# Patient Record
Sex: MALE | Race: WHITE | NOT HISPANIC OR LATINO | Employment: FULL TIME | URBAN - METROPOLITAN AREA
[De-identification: names, ages, dates, MRNs, and addresses within clinical notes are randomized per-mention and may not be internally consistent; named-entity substitution may affect disease eponyms.]

---

## 2017-07-30 ENCOUNTER — HOSPITAL ENCOUNTER (EMERGENCY)
Facility: HOSPITAL | Age: 57
Discharge: HOME | End: 2017-07-30
Attending: EMERGENCY MEDICINE
Payer: COMMERCIAL

## 2017-07-30 VITALS
OXYGEN SATURATION: 96 % | TEMPERATURE: 97.9 F | DIASTOLIC BLOOD PRESSURE: 85 MMHG | SYSTOLIC BLOOD PRESSURE: 149 MMHG | RESPIRATION RATE: 16 BRPM | WEIGHT: 160.5 LBS | HEART RATE: 74 BPM

## 2017-07-30 DIAGNOSIS — T18.108A ESOPHAGEAL FOREIGN BODY, INITIAL ENCOUNTER: Primary | ICD-10-CM

## 2017-07-30 PROCEDURE — 99283 EMERGENCY DEPT VISIT LOW MDM: CPT

## 2017-07-30 RX ORDER — OMEPRAZOLE 20 MG/1
20 CAPSULE, DELAYED RELEASE ORAL DAILY
Qty: 30 CAPSULE | Refills: 0 | Status: SHIPPED | OUTPATIENT
Start: 2017-07-30 | End: 2018-11-08 | Stop reason: SDUPTHER

## 2017-07-30 RX ORDER — ASPIRIN 81 MG/1
81 TABLET, CHEWABLE ORAL
COMMUNITY

## 2017-07-30 RX ADMIN — OMEPRAZOLE MAGNESIUM 20 MG: 20 CAPSULE, DELAYED RELEASE ORAL at 21:59

## 2017-08-14 ENCOUNTER — ALLSCRIPTS OFFICE VISIT (OUTPATIENT)
Dept: OTHER | Facility: OTHER | Age: 57
End: 2017-08-14

## 2017-08-14 DIAGNOSIS — R13.14 DYSPHAGIA, PHARYNGOESOPHAGEAL PHASE: ICD-10-CM

## 2017-08-14 DIAGNOSIS — K21.9 GASTRO-ESOPHAGEAL REFLUX DISEASE WITHOUT ESOPHAGITIS: ICD-10-CM

## 2017-09-23 ENCOUNTER — ANESTHESIA EVENT (OUTPATIENT)
Dept: PERIOP | Facility: AMBULARY SURGERY CENTER | Age: 57
End: 2017-09-23
Payer: COMMERCIAL

## 2017-09-25 NOTE — PRE-PROCEDURE INSTRUCTIONS
Pre-Surgery Instructions:   Medication Instructions    aspirin 81 mg chewable tablet Instructed patient per Anesthesia Guidelines   omeprazole (PriLOSEC) 20 mg delayed release capsule Instructed patient per Anesthesia Guidelines      Pre op instructions given-pt instructed to follow bowel prep given by

## 2017-10-09 ENCOUNTER — ANESTHESIA (OUTPATIENT)
Dept: PERIOP | Facility: AMBULARY SURGERY CENTER | Age: 57
End: 2017-10-09
Payer: COMMERCIAL

## 2017-10-09 ENCOUNTER — GENERIC CONVERSION - ENCOUNTER (OUTPATIENT)
Dept: OTHER | Facility: OTHER | Age: 57
End: 2017-10-09

## 2017-10-09 ENCOUNTER — HOSPITAL ENCOUNTER (OUTPATIENT)
Facility: AMBULARY SURGERY CENTER | Age: 57
Setting detail: OUTPATIENT SURGERY
Discharge: HOME | End: 2017-10-09
Attending: INTERNAL MEDICINE | Admitting: INTERNAL MEDICINE
Payer: COMMERCIAL

## 2017-10-09 VITALS
BODY MASS INDEX: 23.49 KG/M2 | HEIGHT: 68 IN | HEART RATE: 65 BPM | DIASTOLIC BLOOD PRESSURE: 84 MMHG | TEMPERATURE: 98.5 F | WEIGHT: 155 LBS | RESPIRATION RATE: 20 BRPM | SYSTOLIC BLOOD PRESSURE: 122 MMHG | OXYGEN SATURATION: 100 %

## 2017-10-09 DIAGNOSIS — Z12.11 ENCOUNTER FOR SCREENING FOR MALIGNANT NEOPLASM OF COLON: ICD-10-CM

## 2017-10-09 DIAGNOSIS — K21.9 CHRONIC GERD: ICD-10-CM

## 2017-10-09 DIAGNOSIS — R13.19 ESOPHAGEAL DYSPHAGIA: ICD-10-CM

## 2017-10-09 PROCEDURE — 88342 IMHCHEM/IMCYTCHM 1ST ANTB: CPT | Performed by: INTERNAL MEDICINE

## 2017-10-09 PROCEDURE — 88305 TISSUE EXAM BY PATHOLOGIST: CPT | Performed by: INTERNAL MEDICINE

## 2017-10-09 RX ORDER — SODIUM CHLORIDE, SODIUM LACTATE, POTASSIUM CHLORIDE, CALCIUM CHLORIDE 600; 310; 30; 20 MG/100ML; MG/100ML; MG/100ML; MG/100ML
125 INJECTION, SOLUTION INTRAVENOUS CONTINUOUS
Status: DISCONTINUED | OUTPATIENT
Start: 2017-10-09 | End: 2017-10-09 | Stop reason: HOSPADM

## 2017-10-09 RX ORDER — PROPOFOL 10 MG/ML
INJECTION, EMULSION INTRAVENOUS AS NEEDED
Status: DISCONTINUED | OUTPATIENT
Start: 2017-10-09 | End: 2017-10-09 | Stop reason: SURG

## 2017-10-09 RX ORDER — LIDOCAINE HYDROCHLORIDE 10 MG/ML
INJECTION, SOLUTION INFILTRATION; PERINEURAL AS NEEDED
Status: DISCONTINUED | OUTPATIENT
Start: 2017-10-09 | End: 2017-10-09 | Stop reason: SURG

## 2017-10-09 RX ADMIN — PROPOFOL 20 MG: 10 INJECTION, EMULSION INTRAVENOUS at 10:25

## 2017-10-09 RX ADMIN — PROPOFOL 20 MG: 10 INJECTION, EMULSION INTRAVENOUS at 10:41

## 2017-10-09 RX ADMIN — PROPOFOL 30 MG: 10 INJECTION, EMULSION INTRAVENOUS at 10:17

## 2017-10-09 RX ADMIN — PROPOFOL 150 MG: 10 INJECTION, EMULSION INTRAVENOUS at 10:15

## 2017-10-09 RX ADMIN — PROPOFOL 20 MG: 10 INJECTION, EMULSION INTRAVENOUS at 10:31

## 2017-10-09 RX ADMIN — PROPOFOL 20 MG: 10 INJECTION, EMULSION INTRAVENOUS at 10:22

## 2017-10-09 RX ADMIN — PROPOFOL 20 MG: 10 INJECTION, EMULSION INTRAVENOUS at 10:38

## 2017-10-09 RX ADMIN — LIDOCAINE HYDROCHLORIDE 100 MG: 10 INJECTION, SOLUTION INFILTRATION; PERINEURAL at 10:15

## 2017-10-09 RX ADMIN — PROPOFOL 20 MG: 10 INJECTION, EMULSION INTRAVENOUS at 10:18

## 2017-10-09 RX ADMIN — PROPOFOL 20 MG: 10 INJECTION, EMULSION INTRAVENOUS at 10:28

## 2017-10-09 RX ADMIN — PROPOFOL 20 MG: 10 INJECTION, EMULSION INTRAVENOUS at 10:20

## 2017-10-09 RX ADMIN — SODIUM CHLORIDE, SODIUM LACTATE, POTASSIUM CHLORIDE, AND CALCIUM CHLORIDE: .6; .31; .03; .02 INJECTION, SOLUTION INTRAVENOUS at 09:45

## 2017-10-09 RX ADMIN — PROPOFOL 30 MG: 10 INJECTION, EMULSION INTRAVENOUS at 10:35

## 2017-10-09 NOTE — ANESTHESIA POSTPROCEDURE EVALUATION
Post-Op Assessment Note      CV Status:  Stable    Mental Status:  Lethargic    Hydration Status:  Euvolemic    PONV Controlled:  Controlled    Airway Patency:  Patent and adequate    Post Op Vitals Reviewed: Yes          Staff: CRNA       Comments: Airway reflexes intact          BP   98/63   Temp      Pulse  81   Resp      SpO2   96% on RA

## 2017-10-09 NOTE — ANESTHESIA PREPROCEDURE EVALUATION
Review of Systems/Medical History  Patient summary reviewed    No history of anesthetic complications     Cardiovascular    Comment: S/P successful ablation of tachyrhythmia ,  Pulmonary  Asthma: well controlled/ stable , ,        GI/Hepatic  Negative GI/hepatic ROS   GERD , Bowel prep       Negative  ROS        Endo/Other  Negative endo/other ROS      GYN       Hematology  Negative hematology ROS      Musculoskeletal  Negative musculoskeletal ROS        Neurology  Negative neurology ROS      Psychology   Negative psychology ROS            Physical Exam    Airway    Mallampati score:  I  TM Distance: >3 FB  Neck ROM: full     Dental   No notable dental hx     Cardiovascular      Pulmonary      Other Findings        Anesthesia Plan  ASA Score- 2       Anesthesia Type- IV sedation with anesthesia        Induction- intravenous      Informed Consent  Anesthetic plan and risks discussed with patient

## 2017-10-09 NOTE — OP NOTE
ESOPHAGOGASTRODUODENOSCOPY    PROCEDURE: EGD/ Biopsy    INDICATIONS: Dysphagia and GERD, Hx of Food impaction    POST-OP DIAGNOSIS: See the impression below    SEDATION: Monitored anesthesia care, check anesthesia records    PHYSICAL EXAM:    Vitals:    10/09/17 0931   BP: 130/82   Pulse: 75   Resp: 18   Temp: 98 6 °F (37 °C)   SpO2: 99%    Body mass index is 23 57 kg/m²  General: NAD  Heart: S1 & S2 normal, RRR  Lungs: CTA, No rales or rhonchi  Abdomen: Soft, nontender, nondistended, good bowel sounds    CONSENT:  Informed consent was obtained for the procedure, including sedation after explaining the risks and benefits of the procedure  Risks including but not limited to bleeding, perforation, infection, aspiration were discussed in detail  Also explained about less than 100% sensitivity with the exam and other alternatives  PREPARATION:   EKG tracing, pulse oximetry, blood pressure were monitored throughout the procedure  Patient was identified by myself both verbally and by visual inspection of ID band  DESCRIPTION:   Patient was placed in the left lateral decubitus position and was sedated with the above medication  The gastroscope was introduced in to the oropharynx and the esophagus was intubated under direct visualization  Scope was passed down the esophagus up to 2nd part of the duodenum  A careful inspection was made as the gastroscope was withdrawn, including a retroflexed view of the stomach; findings and interventions are described below  FINDINGS:    #1  Esophagus and GEJ-there was mild tracheazation  noted in the mid esophagus, suspicious for eosinophilic esophagitis  Biopsies were taken from the esophagus  Irregular Z-line was noted at 38 cm, with 1 salmon-colored tongue extending 1 cm above the Z-line, suspicious for short-segment Ventura's esophagus  Biopsies were taken  There was a 2 5 cm hiatal hernia starting from 36-38 5 cm      #2  Stomach-mild erythema noted in the gastric antrum, biopsies taken from the antrum and body to assess for H pylori  Retroflexion showed hiatal hernia  No mass lesions were noted  Incisura appeared unremarkable  #3  Duodenum-duodenal mucosa appeared normal in the bulb, D1 and D2  Biopsies were taken to assess for celiac disease  IMPRESSIONS:      1  Irregular squamocolumnar junction, suspicious for short-segment Ventura's esophagus, biopsies taken  2   Mild tracheazation suspicious for eosinophilic esophagitis, biopsies taken from the esophagus  3   Mild antral gastritis  4   Small hiatal hernia  RECOMMENDATIONS:     1  Follow-up biopsy results in 2 weeks  2   Restart on PPI, can change to omeprazole 40 mg daily, to be taken 30 minutes before breakfast   3   Avoid NSAIDs  4   Avoid fatty foods, chocolates, caffeine, alcohol and any other triggering foods  Avoid eating for at least 3 hours before going to bed  COMPLICATIONS:  None; patient tolerated the procedure well            DISPOSITION: PACU           CONDITION: Stable

## 2017-10-09 NOTE — DISCHARGE INSTRUCTIONS
Colonoscopy   WHAT YOU NEED TO KNOW:   A colonoscopy is a procedure to examine the inside of your colon (intestine) with a scope  Polyps or tissue growths may have been removed during your colonoscopy  It is normal to feel bloated and to have some abdominal discomfort  You should be passing gas  If you have hemorrhoids or you had polyps removed, you may have a small amount of bleeding  DISCHARGE INSTRUCTIONS:   Seek care immediately if:   · You have a large amount of bright red blood in your bowel movements  · Your abdomen is hard and firm and you have severe pain  · You have sudden trouble breathing  Contact your healthcare provider if:   · You develop a rash or hives  · You have a fever within 24 hours of your procedure       · You have not had a bowel movement for 3 days after your procedure  · You have questions or concerns about your condition or care  Activity:   · Do not lift, strain, or run  for 3 days after your procedure  · Rest after your procedure  You have been given medicine to relax you  Do not  drive or make important decisions until the day after your procedure  Return to your normal activity as directed  · Relieve gas and discomfort from bloating  by lying on your right side with a heating pad on your abdomen  You may need to take short walks to help the gas move out  Eat small meals until bloating is relieved  If you had polyps removed: For 7 days after your procedure:  · Do not  take aspirin  · Do not  go on long car rides  Follow up with your healthcare provider as directed:  Write down your questions so you remember to ask them during your visits  © 2017 [employer] is for End User's use only and may not be sold, redistributed or otherwise used for commercial purposes  All illustrations and images included in CareNotes® are the copyrighted property of [employer]  or [employer]    The above information is an  only  It is not intended as medical advice for individual conditions or treatments  Talk to your doctor, nurse or pharmacist before following any medical regimen to see if it is safe and effective for you  Upper Endoscopy   WHAT YOU NEED TO KNOW:   An upper endoscopy is also called an upper gastrointestinal (GI) endoscopy, or an esophagogastroduodenoscopy (EGD)  You may feel bloated, gassy, or have some abdominal discomfort after your procedure  Your throat may be sore for 24 to 36 hours  You may burp or pass gas from air that is still inside your body  DISCHARGE INSTRUCTIONS:   Call 911 for any of the following:   · You have sudden chest pain or trouble breathing  Seek care immediately if:   · You feel dizzy or faint  · You have trouble swallowing  · Your bowel movements are very dark or black  · Your abdomen is hard and firm and you have severe pain  · You vomit blood  Contact your healthcare provider if:   · You feel full or bloated and cannot burp or pass gas  · You have not had a bowel movement for 3 days after your procedure  · You have neck pain  · You have a fever or chills  · You have nausea or are vomiting  · You have a rash or hives  · You have questions or concerns about your endoscopy  Relieve a sore throat:  Suck on throat lozenges or crushed ice  Gargle with a small amount of warm salt water  Mix 1 teaspoon of salt and 1 cup of warm water to make salt water  Relieve gas and discomfort from bloating:  Lie on your right side with a heating pad on your abdomen  Take short walks to help pass gas  Eat small meals until bloating is relieved  Rest after your procedure: You have been given medicine to relax you  Do not  drive or make important decisions until the day after your procedure  Return to your normal activity as directed  You can usually return to work the day after your procedure    Follow up with your healthcare provider as directed:  Write down your questions so you remember to ask them during your visits  © 2017 [employer] is for End User's use only and may not be sold, redistributed or otherwise used for commercial purposes  All illustrations and images included in CareNotes® are the copyrighted property of [employer]  or [employer]  The above information is an  only  It is not intended as medical advice for individual conditions or treatments  Talk to your doctor, nurse or pharmacist before following any medical regimen to see if it is safe and effective for you

## 2017-10-09 NOTE — OP NOTE
COLONOSCOPY    PROCEDURE: Colonoscopy/ Biopsy    INDICATIONS: Screening for Colon Cancer    POST-OP DIAGNOSIS: See the impression below    SEDATION: Monitored anesthesia care, check anesthesia records    PHYSICAL EXAM:    /82   Pulse 75   Temp 98 6 °F (37 °C) (Tympanic)   Resp 18   Ht 5' 8" (1 727 m)   Wt 70 3 kg (155 lb)   SpO2 99%   BMI 23 57 kg/m²   Body mass index is 23 57 kg/m²  General: NAD  Heart: S1 & S2 normal, RRR  Lungs: CTA, No rales or rhonchi  Abdomen: Soft, nontender, nondistended, good bowel sounds    CONSENT:  Informed consent was obtained for the procedure, including sedation after explaining the risks and benefits of the procedure  Risks including but not limited to bleeding, perforation, infection, aspiration were discussed in detail  Also explained about less than 100% sensitivity with the exam and other alternatives  PREPARATION:   EKG tracing, pulse oximetry, blood pressure were monitored throughout the procedure  Patient was identified by myself both verbally and by visual inspection of ID band  DESCRIPTION:   Patient was placed in the left lateral decubitus position and was sedated with the above medication  Digital rectal examination was performed  The colonoscope was introduced in to the anal canal and advanced up to terminal ileum  The colonic mucosa was carefully examined as the colonoscope was withdrawn, including a retroflexed view of the rectum; findings and interventions are described below  Appropriate photodocumentation was obtained  The quality of the colonic preparation was good  FINDINGS:    1  Normal appearing colonic mucosa  2   Terminal ileum was intubated and appeared unremarkable  3   Few scattered diverticuli noted in the sigmoid colon  4   Retroflexion was performed and showed small internal hemorrhoids  IMPRESSIONS:      1  Normal appearing colonic mucosa  2   Terminal ileum was intubated and appeared unremarkable    3   Few scattered diverticuli noted in the sigmoid colon  4   Retroflexion was performed and showed small internal hemorrhoids  RECOMMENDATIONS:    1  High-fiber diet  2   Repeat colonoscopy in 10 years, sooner if any family history of colon cancer or any alarm symptoms  COMPLICATIONS:  None; patient tolerated the procedure well      DISPOSITION: PACU           CONDITION: Stable

## 2017-10-09 NOTE — H&P
History and Physical - [facility]  Peng Bolaños 61 y o  male MRN: [id number]    HPI: Peng Bolaños is a 61y o  year old male who presents with Dysphagia and CRC screening  Review of Systems    Historical Information   Past Medical History:   Diagnosis Date    Asthma     allergy induced    GERD (gastroesophageal reflux disease)     Irregular heart beat     has had an ablation since     Past Surgical History:   Procedure Laterality Date    ABLATION OF DYSRHYTHMIC FOCUS      KNEE ARTHROSCOPY Left     KNEE CARTILAGE SURGERY Left      Social History   History   Alcohol Use    Yes     Comment: socially     History   Drug Use No     History   Smoking Status    Never Smoker   Smokeless Tobacco    Never Used     History reviewed  No pertinent family history  Meds/Allergies     Prescriptions Prior to Admission   Medication    aspirin 81 mg chewable tablet    omeprazole (PriLOSEC) 20 mg delayed release capsule       Allergies   Allergen Reactions    Compazine [Prochlorperazine] Other (See Comments)     Muscles locked and very weak muscle       Objective     Blood pressure 130/82, pulse 75, temperature 98 6 °F (37 °C), temperature source Tympanic, resp  rate 18, height 5' 8" (1 727 m), weight 70 3 kg (155 lb), SpO2 99 %  PHYSICAL EXAM    Gen: NAD  CV: RRR  CHEST: Clear  ABD: soft, NT/ND  EXT: no edema  Neuro: AAO      ASSESSMENT/PLAN:  This is a 61y o  year old male here for Dysphagia and CRC screening      PLAN:   Procedure: EGD and Colonoscopy

## 2017-10-18 ENCOUNTER — GENERIC CONVERSION - ENCOUNTER (OUTPATIENT)
Dept: OTHER | Facility: OTHER | Age: 57
End: 2017-10-18

## 2018-01-13 VITALS
DIASTOLIC BLOOD PRESSURE: 76 MMHG | OXYGEN SATURATION: 99 % | SYSTOLIC BLOOD PRESSURE: 122 MMHG | TEMPERATURE: 95.9 F | WEIGHT: 157 LBS | HEART RATE: 89 BPM

## 2018-01-13 NOTE — RESULT NOTES
Verified Results  (1) TISSUE EXAM [id number] 10:17AM Trevonita      Test Name Result Flag Reference   LAB AP CASE REPORT (Report)     Surgical Pathology Report             Case: [id number]                   Authorizing Provider: Alexandra Tony MD    Collected:      10/09/2017 1017        Ordering Location:   [facility]    Received:      10/10/2017 [facility]                            Pathologist:      Michelle Gomes MD                             Specimens:  A) - Duodenum, Duodenal bx                                       B) - Stomach, Gastric bx                                        C) - Esophagus, Distal esoph bx                                    D) - Esophagus, Esophageal bx   LAB AP FINAL DIAGNOSIS (Report)     A  Duodenum, biopsy:    - No significant histologic abnormality     - No increase in intraepithelial lymphocytes, villous blunting, acute   and chronic inflammation     - Negative for granulomas, dysplasia and malignancy  B  Gastric biopsy:    - No significant histologic abnormality     - Negative for Helicobacter pylori organisms (immunohistochemical stain   with appropriate positive control)  C  Distal esophagus, biopsy:    - Gastric cardia type mucosa with mild chronic inflammation     - Negative for intestinal metaplasia, dysplasia and malignancy  D  Esophagus, biopsy:    - 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   ranging from 3-10/hpf  - Negative for intestinal metaplasia, dysplasia and malignancy  Electronically signed by Michelle Gomes MD on 10/11/2017 at 8:50 AM   LAB AP NOTE      The findings noted in the esophageal biopsy (part D) are in favor of   reflux over eosinophilic esophagitis  Correlation with clinical and endoscopic findings is suggested      Interpretation performed at [address]   [address] LAB AP SURGICAL ADDITIONAL INFORMATION (Report)     All controls performed with the immunohistochemical stains reported above   reacted appropriately  These tests were developed and their performance   characteristics determined by [facility] or   [facility]  They may not be cleared or approved by the [place]  Food and Drug Administration  The FDA has determined that such clearance   or approval is not necessary  These tests are used for clinical purposes  They should not be regarded as investigational or for research  This   laboratory has been approved by [place] [address], designated as a high-complexity   laboratory and is qualified to perform these tests  LAB AP GROSS DESCRIPTION (Report)     A  The specimen is received in formalin, labeled with the patient's name   and hospital number, and is designated duodenal biopsy  The specimen   consists of 5 tan to yellow soft tissue fragments ranging in greatest   dimension from 0 3 to 0 4 centimeters  Entirely submitted  One cassette  B  The specimen is received in formalin, labeled with the patient's name   and hospital number, and is designated Gastric biopsy  The specimen   consists of 2 white to tan soft tissue fragments measuring 0 5 and 0 6   centimeters in greatest dimension  Entirely submitted  One cassette  C  The specimen is received in formalin, labeled with the patient's name   and hospital number, and is designated Distal esophagus biopsy  The   specimen consists of 3 white to tan soft tissue fragments ranging in   greatest dimension from 0 2 to 0 4 centimeters  Entirely submitted  One   cassette  D  The specimen is received in formalin, labeled with the patient's name   and hospital number, and is designated Esophageal biopsy  The specimen   consists of an aggregate of colorless to white soft tissue fragments   measuring 0 7 x 0 4 by less than 0 1 centimeters   Due to the size and   consistency of the specimen, it is questionable whether tissue will   survive histologic processing  Entirely submitted  One cassette  Note: The estimated total formalin fixation time based upon information   provided by the submitting clinician and the standard processing schedule   is less than 72 hours      LMS   LAB AP CLINICAL INFORMATION R/o celiac     R/o celiac

## 2018-11-08 ENCOUNTER — TELEPHONE (OUTPATIENT)
Dept: GASTROENTEROLOGY | Facility: CLINIC | Age: 58
End: 2018-11-08

## 2018-11-08 DIAGNOSIS — K21.9 GASTROESOPHAGEAL REFLUX DISEASE, ESOPHAGITIS PRESENCE NOT SPECIFIED: Primary | ICD-10-CM

## 2018-11-08 RX ORDER — OMEPRAZOLE 40 MG/1
1 CAPSULE, DELAYED RELEASE ORAL 2 TIMES DAILY
COMMUNITY
Start: 2017-08-14 | End: 2018-11-08 | Stop reason: SDUPTHER

## 2018-11-08 RX ORDER — OMEPRAZOLE 40 MG/1
40 CAPSULE, DELAYED RELEASE ORAL 2 TIMES DAILY
Qty: 90 CAPSULE | Refills: 4 | Status: SHIPPED | OUTPATIENT
Start: 2018-11-08 | End: 2019-01-17

## 2018-11-08 NOTE — TELEPHONE ENCOUNTER
Dr Ricco Ledezma pt    [facility] to [facility] if the script for omeprazole can be changed to 90 day   [phone] reference # [id number]

## 2019-01-17 ENCOUNTER — OFFICE VISIT (OUTPATIENT)
Dept: GASTROENTEROLOGY | Facility: AMBULARY SURGERY CENTER | Age: 59
End: 2019-01-17
Payer: COMMERCIAL

## 2019-01-17 VITALS
DIASTOLIC BLOOD PRESSURE: 85 MMHG | RESPIRATION RATE: 18 BRPM | HEIGHT: 69 IN | TEMPERATURE: 97.6 F | BODY MASS INDEX: 22.84 KG/M2 | WEIGHT: 154.2 LBS | HEART RATE: 82 BPM | SYSTOLIC BLOOD PRESSURE: 112 MMHG

## 2019-01-17 DIAGNOSIS — K21.00 GASTROESOPHAGEAL REFLUX DISEASE WITH ESOPHAGITIS: Primary | ICD-10-CM

## 2019-01-17 PROCEDURE — 99213 OFFICE O/P EST LOW 20 MIN: CPT | Performed by: PHYSICIAN ASSISTANT

## 2019-01-17 RX ORDER — RANITIDINE 300 MG/1
300 TABLET ORAL
Qty: 90 TABLET | Refills: 3 | Status: SHIPPED | OUTPATIENT
Start: 2019-01-17 | End: 2020-02-14 | Stop reason: ALTCHOICE

## 2019-01-17 NOTE — PROGRESS NOTES
Assessment and Plan    #1  GERD with esophagitis: patient has a history of food impaction which resolved on its own in July 2017  He underwent EGD in Oct 2017 which showed mild esophagitis but no Ventura's or EoE  He was taking omeprazole 40 mg BID with some abdominal discomfort ad indigestion so he decreased to once daily  This persisted so he stopped the medication with much improvement but now has occasional night time acid reflux symptoms  No dysphagia  No other GI symptoms  -Advised that we try Zantac 300 mg before bed instead as he will likely not get the same side effects but will prevent acid reflux at night  -Also discussed anti-reflux diet and to avoid chocolate, citrus, carbonated beverages, caffeine, alcohol, acidic foods, tomato based products  Also advised patient that he should try to elevate the head of the bed and avoid eating within 3 hr of lying down   -patient to follow-up in 1 year  If he has any worsening symptoms or any concerns, he is to call sooner    --------------------------------------------------------------------------------------------------------------------    Chief Complaint: f/u GERD    HPI: Tabitha Yo is a 60 y o  male who presents today for follow up for GERD  The patient was seen back in July 2017 in the ER due to a food impaction which passed on its own  The patient followed up in the office and subsequently underwent an EGD and colonoscopy in October of 2017  The EGD showed a very small tongue in the bottom of the esophagus but this was negative for Ventura's  He was also negative for eosinophilic esophagitis  He reports that he was placed on omeprazole twice daily and had indigestion and abdominal discomfort on the medication  He reports he then drop it down to once daily for some time but continued to have some of the side effects  He subsequently stopped the medication and the symptoms resolved    However now he will occasionally get acid reflux symptoms mainly at nighttime  He is not currently taking anything for acid reflux  He denies any dysphagia symptoms and denies any food getting stuck at this time  He denies any other GI symptoms such as nausea, vomiting, abdominal pain, unexpected weight loss, diarrhea, constipation, blood in stool, or black tarry stools  Patient's next colonoscopy is due in 2027  Review of Systems:   General: negative for fatigue, fever, night sweats or unexpected weight loss  Psychological: negative for anxiety or depression  Ophthalmic: negative for blurry vision or scleral icterus  ENT: negative for headaches, oral lesions, sore throat, vocal changes or dysphagia  Hematological and Lymphatic: negative for pallor or swollen lymph nodes  Respiratory: negative for cough, shortness of breath or wheezing  Cardiovascular: negative for chest pain, edema or murmur  Gastrointestinal: as mentioned in HPI  Genito-Urinary: negative for dysuria or incontinence  Musculoskeletal: negative for joint pain, joint stiffness or joint swelling  Dermatological: negative for pruritus, rash, or jaundice    Current Medications  Current Outpatient Prescriptions   Medication Sig Dispense Refill    aspirin 81 mg chewable tablet Chew 81 mg daily in the early morning        ranitidine (ZANTAC) 300 MG tablet Take 1 tablet (300 mg total) by mouth daily at bedtime 90 tablet 3     No current facility-administered medications for this visit          Past Medical History  Past Medical History:   Diagnosis Date    Asthma     allergy induced    GERD (gastroesophageal reflux disease)     Irregular heart beat     has had an ablation since       Past Surgical History  Past Surgical History:   Procedure Laterality Date    ABLATION OF DYSRHYTHMIC FOCUS      KNEE ARTHROSCOPY Left     KNEE CARTILAGE SURGERY Left     UT COLONOSCOPY FLX DX W/COLLJ SPEC WHEN PFRMD N/A 10/9/2017    Procedure: EGD AND COLONOSCOPY;  Surgeon: Roe Langley MD;  [facility];  Service: Gastroenterology       Past Social History   Social History     Social History    Marital status: /Civil Union     Spouse name: N/A    Number of children: N/A    Years of education: N/A     Social History Main Topics    Smoking status: Never Smoker    Smokeless tobacco: Never Used    Alcohol use Yes      Comment: socially    Drug use: No    Sexual activity: Not Asked     Other Topics Concern    None     Social History Narrative    None       The following portions of the patient's history were reviewed and updated as appropriate: allergies, current medications, past family history, past medical history, past social history, past surgical history and problem list     Vital Signs  Vitals:    01/17/19 0821   BP: 112/85   BP Location: Left arm   Patient Position: Sitting   Cuff Size: Standard   Pulse: 82   Resp: 18   Temp: 97 6 °F (36 4 °C)   TempSrc: Tympanic   Weight: 69 9 kg (154 lb 3 2 oz)   Height: 5' 8 5" (1 74 m)       Physical Exam:  General appearance: alert, cooperative, no distress  HEENT: normocephalic, anicteric, no eye erythema or discharge, no oropharyngeal thrush  Neck: supple, trachea midline, no adenopathy  Lungs: CTA b/l, no rales, rhonchi, or wheezing, unlabored respirations  Heart: RRR, no murmur, rubs, or gallops  Abdomen: soft, non-tender, non-distended, normal bowel sounds, no masses or organomegaly  Rectal: deferred  Extremities: no cyanosis, clubbing, or edema  Musculoskeletal: normal gait  Skin: color and texture normal, no jaundice, no rashes or lesions  Psychiatric: alert and oriented, normal affect and behavior

## 2019-01-17 NOTE — LETTER
January 17, 2019     DO Iza Boothe [address]    Patient: Juan Carlos Gaxiola   YOB: 1960   Date of Visit: 1/17/2019       Dear Dr Gerber Morelos:    Thank you for referring Amada Brown to me for evaluation  Below are my notes for this consultation  If you have questions, please do not hesitate to call me  I look forward to following your patient along with you  Sincerely,        Dallas Mercer PA-C        CC: No Recipients  Dallas Mercer PA-C  1/17/2019  9:05 AM  Sign at close encounter  Assessment and Plan    #1  GERD with esophagitis: patient has a history of food impaction which resolved on its own in July 2017  He underwent EGD in Oct 2017 which showed mild esophagitis but no Ventura's or EoE  He was taking omeprazole 40 mg BID with some abdominal discomfort ad indigestion so he decreased to once daily  This persisted so he stopped the medication with much improvement but now has occasional night time acid reflux symptoms  No dysphagia  No other GI symptoms  -Advised that we try Zantac 300 mg before bed instead as he will likely not get the same side effects but will prevent acid reflux at night  -Also discussed anti-reflux diet and to avoid chocolate, citrus, carbonated beverages, caffeine, alcohol, acidic foods, tomato based products  Also advised patient that he should try to elevate the head of the bed and avoid eating within 3 hr of lying down   -patient to follow-up in 1 year  If he has any worsening symptoms or any concerns, he is to call sooner    --------------------------------------------------------------------------------------------------------------------    Chief Complaint: f/u GERD    HPI: Juan Carlos Gaxiola is a 60 y o  male who presents today for follow up for GERD  The patient was seen back in July 2017 in the ER due to a food impaction which passed on its own    The patient followed up in the office and subsequently underwent an EGD and colonoscopy in October of 2017  The EGD showed a very small tongue in the bottom of the esophagus but this was negative for Ventura's  He was also negative for eosinophilic esophagitis  He reports that he was placed on omeprazole twice daily and had indigestion and abdominal discomfort on the medication  He reports he then drop it down to once daily for some time but continued to have some of the side effects  He subsequently stopped the medication and the symptoms resolved  However now he will occasionally get acid reflux symptoms mainly at nighttime  He is not currently taking anything for acid reflux  He denies any dysphagia symptoms and denies any food getting stuck at this time  He denies any other GI symptoms such as nausea, vomiting, abdominal pain, unexpected weight loss, diarrhea, constipation, blood in stool, or black tarry stools  Patient's next colonoscopy is due in 2027       Review of Systems:   General: negative for fatigue, fever, night sweats or unexpected weight loss  Psychological: negative for anxiety or depression  Ophthalmic: negative for blurry vision or scleral icterus  ENT: negative for headaches, oral lesions, sore throat, vocal changes or dysphagia  Hematological and Lymphatic: negative for pallor or swollen lymph nodes  Respiratory: negative for cough, shortness of breath or wheezing  Cardiovascular: negative for chest pain, edema or murmur  Gastrointestinal: as mentioned in HPI  Genito-Urinary: negative for dysuria or incontinence  Musculoskeletal: negative for joint pain, joint stiffness or joint swelling  Dermatological: negative for pruritus, rash, or jaundice    Current Medications  Current Outpatient Prescriptions   Medication Sig Dispense Refill    aspirin 81 mg chewable tablet Chew 81 mg daily in the early morning        ranitidine (ZANTAC) 300 MG tablet Take 1 tablet (300 mg total) by mouth daily at bedtime 90 tablet 3     No current facility-administered medications for this visit  Past Medical History  Past Medical History:   Diagnosis Date    Asthma     allergy induced    GERD (gastroesophageal reflux disease)     Irregular heart beat     has had an ablation since       Past Surgical History  Past Surgical History:   Procedure Laterality Date    ABLATION OF DYSRHYTHMIC FOCUS      KNEE ARTHROSCOPY Left     KNEE CARTILAGE SURGERY Left     AZ COLONOSCOPY FLX DX W/COLLJ SPEC WHEN PFRMD N/A 10/9/2017    Procedure: EGD AND COLONOSCOPY;  Surgeon: Lisseth Fuller MD;  [facility];   Service: Gastroenterology       Past Social History   Social History     Social History    Marital status: /Civil Union     Spouse name: N/A    Number of children: N/A    Years of education: N/A     Social History Main Topics    Smoking status: Never Smoker    Smokeless tobacco: Never Used    Alcohol use Yes      Comment: socially    Drug use: No    Sexual activity: Not Asked     Other Topics Concern    None     Social History Narrative    None       The following portions of the patient's history were reviewed and updated as appropriate: allergies, current medications, past family history, past medical history, past social history, past surgical history and problem list     Vital Signs  Vitals:    01/17/19 0821   BP: 112/85   BP Location: Left arm   Patient Position: Sitting   Cuff Size: Standard   Pulse: 82   Resp: 18   Temp: 97 6 °F (36 4 °C)   TempSrc: Tympanic   Weight: 69 9 kg (154 lb 3 2 oz)   Height: 5' 8 5" (1 74 m)       Physical Exam:  General appearance: alert, cooperative, no distress  HEENT: normocephalic, anicteric, no eye erythema or discharge, no oropharyngeal thrush  Neck: supple, trachea midline, no adenopathy  Lungs: CTA b/l, no rales, rhonchi, or wheezing, unlabored respirations  Heart: RRR, no murmur, rubs, or gallops  Abdomen: soft, non-tender, non-distended, normal bowel sounds, no masses or organomegaly  Rectal: deferred  Extremities: no cyanosis, clubbing, or edema  Musculoskeletal: normal gait  Skin: color and texture normal, no jaundice, no rashes or lesions  Psychiatric: alert and oriented, normal affect and behavior

## 2019-05-02 ENCOUNTER — TELEPHONE (OUTPATIENT)
Dept: GASTROENTEROLOGY | Facility: AMBULARY SURGERY CENTER | Age: 59
End: 2019-05-02

## 2020-01-14 ENCOUNTER — TELEPHONE (OUTPATIENT)
Dept: GASTROENTEROLOGY | Facility: AMBULARY SURGERY CENTER | Age: 60
End: 2020-01-14

## 2020-02-14 ENCOUNTER — OFFICE VISIT (OUTPATIENT)
Dept: GASTROENTEROLOGY | Facility: CLINIC | Age: 60
End: 2020-02-14
Payer: COMMERCIAL

## 2020-02-14 VITALS
SYSTOLIC BLOOD PRESSURE: 120 MMHG | DIASTOLIC BLOOD PRESSURE: 74 MMHG | TEMPERATURE: 98.7 F | HEART RATE: 67 BPM | WEIGHT: 161 LBS | HEIGHT: 67 IN | BODY MASS INDEX: 25.27 KG/M2

## 2020-02-14 DIAGNOSIS — K21.9 GASTROESOPHAGEAL REFLUX DISEASE, ESOPHAGITIS PRESENCE NOT SPECIFIED: Primary | ICD-10-CM

## 2020-02-14 PROCEDURE — 99212 OFFICE O/P EST SF 10 MIN: CPT | Performed by: PHYSICIAN ASSISTANT

## 2020-02-14 RX ORDER — FAMOTIDINE 40 MG/1
TABLET, FILM COATED ORAL
COMMUNITY
Start: 2019-12-23 | End: 2020-02-14 | Stop reason: SDUPTHER

## 2020-02-14 RX ORDER — FAMOTIDINE 40 MG/1
40 TABLET, FILM COATED ORAL 2 TIMES DAILY
Qty: 180 TABLET | Refills: 3 | Status: SHIPPED | OUTPATIENT
Start: 2020-02-14

## 2020-02-14 NOTE — PROGRESS NOTES
[facility] - Outpatient Follow-up Note  Alan To 60 y o  male MRN: [id number]  Encounter: [id number]  ASSESSMENT AND PLAN:      GERD  -Had been doing well on ranitidine until recall  -recommend he start famotidine 40 daiy, BID if needed  -reviewed reflux diet  -follow up in 1 year, call sooner if needed  ______________________________________________________________________    SUBJECTIVE:  Pleasant 61 yo male with a chronic history of GERD, was on PPI in the past, at a point switched to ranitidine but then stopped this when it was recalled  He then had recurrent indigestion and GERD off and on  He denies dysphagia or abdominal pain  Denies changes in bowel habits      REVIEW OF SYSTEMS IS OTHERWISE NEGATIVE  Historical Information   Past Medical History:   Diagnosis Date    Asthma     allergy induced    GERD (gastroesophageal reflux disease)     Irregular heart beat     has had an ablation since     Past Surgical History:   Procedure Laterality Date    ABLATION OF DYSRHYTHMIC FOCUS      KNEE ARTHROSCOPY Left     KNEE CARTILAGE SURGERY Left     SD COLONOSCOPY FLX DX W/COLLJ SPEC WHEN PFRMD N/A 10/9/2017    Procedure: EGD AND COLONOSCOPY;  Surgeon: Angely Corral MD;  [facility];   Service: Gastroenterology     Social History   Social History     Substance and Sexual Activity   Alcohol Use Yes    Comment: socially     Social History     Substance and Sexual Activity   Drug Use No     Social History     Tobacco Use   Smoking Status Never Smoker   Smokeless Tobacco Never Used     Family History   Problem Relation Age of Onset    No Known Problems Mother        Meds/Allergies       Current Outpatient Medications:     aspirin 81 mg chewable tablet    ranitidine (ZANTAC) 300 MG tablet    Allergies   Allergen Reactions    Compazine [Prochlorperazine] Other (See Comments)     Muscles locked and very weak muscle           Objective     There were no vitals taken for this visit  There is no height or weight on file to calculate BMI  PHYSICAL EXAM:      General Appearance:   Alert, cooperative, no distress   HEENT:   Normocephalic, atraumatic, anicteric      Neck:  Supple, symmetrical, trachea midline   Lungs:   ; respirations unlabored    Heart[de-identified]   Regular rate and rhythm; no murmur, rub, or gallop  Abdomen:   Soft, non-tender, non-distended; normal bowel sounds; no masses, no organomegaly                              Lab Results:   No visits with results within 1 Day(s) from this visit  Latest known visit with results is:   Admission on 10/09/2017, Discharged on 10/09/2017   Component Date Value    Case Report 10/09/2017                      Value:Surgical Pathology Report                         Case: [id number]                                   Authorizing Provider:  Lovely Ascencio MD       Collected:           10/09/2017 1017              Ordering Location:     [facility]        Received:            10/10/2017 [address] [place] [address]                                                      Pathologist:           Abhishek Merchant MD                                                        Specimens:   A) - Duodenum, Duodenal bx                                                                          B) - Stomach, Gastric bx                                                                            C) - Esophagus, Distal esoph bx                                                                     D) - Esophagus, Esophageal bx                                                              Final Diagnosis 10/09/2017                      Value: This result contains rich text formatting which cannot be displayed here   Note 10/09/2017                      Value: This result contains rich text formatting which cannot be displayed here   Additional Information 10/09/2017                      Value: This result contains rich text formatting which cannot be displayed here  Wyatt Gross Description 10/09/2017                      Value: This result contains rich text formatting which cannot be displayed here   Clinical Information 10/09/2017                      Value:R/o celiac         Radiology Results:   No results found

## 2024-07-09 ENCOUNTER — HOSPITAL ENCOUNTER (OUTPATIENT)
Dept: HOSPITAL 99 - RAD | Age: 64
End: 2024-07-09
Payer: COMMERCIAL

## 2024-07-09 DIAGNOSIS — M79.89: Primary | ICD-10-CM

## 2024-11-07 ENCOUNTER — HOSPITAL ENCOUNTER (OUTPATIENT)
Dept: HOSPITAL 99 - MRI | Age: 64
End: 2024-11-07
Payer: COMMERCIAL

## 2024-11-07 DIAGNOSIS — R22.42: Primary | ICD-10-CM

## 2025-06-06 ENCOUNTER — HOSPITAL ENCOUNTER (OUTPATIENT)
Dept: HOSPITAL 99 - RAD | Age: 65
End: 2025-06-06
Payer: COMMERCIAL

## 2025-06-06 DIAGNOSIS — I77.89: Primary | ICD-10-CM
